# Patient Record
Sex: MALE | Race: WHITE
[De-identification: names, ages, dates, MRNs, and addresses within clinical notes are randomized per-mention and may not be internally consistent; named-entity substitution may affect disease eponyms.]

---

## 2019-04-25 ENCOUNTER — HOSPITAL ENCOUNTER (OUTPATIENT)
Dept: HOSPITAL 95 - ORSCSDS | Age: 59
Discharge: HOME | End: 2019-04-25
Attending: SURGERY
Payer: COMMERCIAL

## 2019-04-25 VITALS — BODY MASS INDEX: 34.13 KG/M2 | HEIGHT: 69.02 IN | WEIGHT: 230.41 LBS

## 2019-04-25 DIAGNOSIS — D12.2: ICD-10-CM

## 2019-04-25 DIAGNOSIS — E66.01: ICD-10-CM

## 2019-04-25 DIAGNOSIS — Z12.11: Primary | ICD-10-CM

## 2019-04-25 PROCEDURE — 0DBK8ZX EXCISION OF ASCENDING COLON, VIA NATURAL OR ARTIFICIAL OPENING ENDOSCOPIC, DIAGNOSTIC: ICD-10-PCS | Performed by: SURGERY

## 2024-10-15 ENCOUNTER — HOSPITAL ENCOUNTER (OUTPATIENT)
Dept: HOSPITAL 95 - ORSCSDS | Age: 64
Discharge: HOME | End: 2024-10-15
Attending: SURGERY
Payer: COMMERCIAL

## 2024-10-15 VITALS — WEIGHT: 234.35 LBS | HEIGHT: 70 IN | BODY MASS INDEX: 33.55 KG/M2

## 2024-10-15 VITALS — SYSTOLIC BLOOD PRESSURE: 118 MMHG | DIASTOLIC BLOOD PRESSURE: 78 MMHG

## 2024-10-15 DIAGNOSIS — K21.9: ICD-10-CM

## 2024-10-15 DIAGNOSIS — Z12.11: Primary | ICD-10-CM

## 2024-10-15 DIAGNOSIS — Z86.0101: ICD-10-CM

## 2024-10-15 DIAGNOSIS — Z85.46: ICD-10-CM

## 2024-10-15 PROCEDURE — 0DJD8ZZ INSPECTION OF LOWER INTESTINAL TRACT, VIA NATURAL OR ARTIFICIAL OPENING ENDOSCOPIC: ICD-10-PCS | Performed by: SURGERY

## 2024-10-23 ENCOUNTER — HOSPITAL ENCOUNTER (OUTPATIENT)
Dept: HOSPITAL 95 - ORSCMMR | Age: 64
Discharge: HOME | End: 2024-10-23
Attending: SURGERY
Payer: COMMERCIAL

## 2024-10-23 VITALS — DIASTOLIC BLOOD PRESSURE: 82 MMHG | SYSTOLIC BLOOD PRESSURE: 113 MMHG

## 2024-10-23 VITALS — BODY MASS INDEX: 35.21 KG/M2 | WEIGHT: 235.01 LBS | HEIGHT: 68.5 IN

## 2024-10-23 VITALS — DIASTOLIC BLOOD PRESSURE: 79 MMHG | SYSTOLIC BLOOD PRESSURE: 114 MMHG

## 2024-10-23 VITALS — SYSTOLIC BLOOD PRESSURE: 122 MMHG | DIASTOLIC BLOOD PRESSURE: 76 MMHG

## 2024-10-23 VITALS — SYSTOLIC BLOOD PRESSURE: 127 MMHG | DIASTOLIC BLOOD PRESSURE: 82 MMHG

## 2024-10-23 VITALS — SYSTOLIC BLOOD PRESSURE: 136 MMHG | DIASTOLIC BLOOD PRESSURE: 85 MMHG

## 2024-10-23 VITALS — DIASTOLIC BLOOD PRESSURE: 79 MMHG | SYSTOLIC BLOOD PRESSURE: 119 MMHG

## 2024-10-23 DIAGNOSIS — K42.9: Primary | ICD-10-CM

## 2024-10-23 DIAGNOSIS — E66.9: ICD-10-CM

## 2024-10-23 DIAGNOSIS — K21.9: ICD-10-CM

## 2024-10-23 PROCEDURE — 0WUF0JZ SUPPLEMENT ABDOMINAL WALL WITH SYNTHETIC SUBSTITUTE, OPEN APPROACH: ICD-10-PCS | Performed by: SURGERY

## 2024-10-23 PROCEDURE — C1781 MESH (IMPLANTABLE): HCPCS

## 2024-10-23 NOTE — NUR
Patient up to Ambulate independently. Gait steady. VSS AND CONSISTENT WITH PT
BASELINE. PT HAS NO COMPLAINTS AND VERBALIZES READINESS TO GO HOME. Discharge
instructions reviewed with patient. Patient verbalizes understanding. Copy
given to patient to take home. Dressing to procedure site clean, dry, intact
with no visible drainage, swelling, erythema or bruising noted. Patient
States Post-Procedure ride home has been arranged. Discharged via wheelchair
to private car for ride home. PT BELONGINGS RETURNED TO PT.

## 2024-10-23 NOTE — NUR
REPORT RECEIVED FROM TERRI RN. VSS. PT ON RA. PT ABLE TO REPOSITION SELF IN
BED. PT REQUESTING PO FLUIDS AND TOLERATING THEM WELL. PT DENIES PAIN, NAUSEA
OR OTHER DISCOMFORTS. PT HAS GAUZE AND TEGADERM TO ABDOMEN THAT IS C/D/I
WITHOUT DRAINAGE, REDNESS, OR SWELLING.